# Patient Record
Sex: FEMALE | Race: OTHER | NOT HISPANIC OR LATINO | URBAN - METROPOLITAN AREA
[De-identification: names, ages, dates, MRNs, and addresses within clinical notes are randomized per-mention and may not be internally consistent; named-entity substitution may affect disease eponyms.]

---

## 2023-07-19 ENCOUNTER — INPATIENT (INPATIENT)
Facility: HOSPITAL | Age: 48
LOS: 1 days | Discharge: HOME | DRG: 204 | End: 2023-07-21
Attending: HOSPITALIST | Admitting: HOSPITALIST
Payer: COMMERCIAL

## 2023-07-19 VITALS
DIASTOLIC BLOOD PRESSURE: 70 MMHG | TEMPERATURE: 98 F | HEIGHT: 66 IN | HEART RATE: 85 BPM | WEIGHT: 102.07 LBS | OXYGEN SATURATION: 98 % | RESPIRATION RATE: 20 BRPM | SYSTOLIC BLOOD PRESSURE: 109 MMHG

## 2023-07-19 DIAGNOSIS — Z98.890 OTHER SPECIFIED POSTPROCEDURAL STATES: Chronic | ICD-10-CM

## 2023-07-19 LAB
ALBUMIN SERPL ELPH-MCNC: 4.2 G/DL — SIGNIFICANT CHANGE UP (ref 3.3–5)
ALP SERPL-CCNC: 99 U/L — SIGNIFICANT CHANGE UP (ref 40–120)
ALT FLD-CCNC: 39 U/L — SIGNIFICANT CHANGE UP (ref 10–45)
ANION GAP SERPL CALC-SCNC: 15 MMOL/L — SIGNIFICANT CHANGE UP (ref 5–17)
AST SERPL-CCNC: 47 U/L — HIGH (ref 10–40)
BASE EXCESS BLDV CALC-SCNC: 0.5 MMOL/L — SIGNIFICANT CHANGE UP (ref -2–3)
BASE EXCESS BLDV CALC-SCNC: 2.8 MMOL/L — SIGNIFICANT CHANGE UP (ref -2–3)
BASOPHILS # BLD AUTO: 0 K/UL — SIGNIFICANT CHANGE UP (ref 0–0.2)
BASOPHILS NFR BLD AUTO: 0 % — SIGNIFICANT CHANGE UP (ref 0–2)
BILIRUB SERPL-MCNC: 0.3 MG/DL — SIGNIFICANT CHANGE UP (ref 0.2–1.2)
BUN SERPL-MCNC: 11 MG/DL — SIGNIFICANT CHANGE UP (ref 7–23)
CA-I SERPL-SCNC: 1.16 MMOL/L — SIGNIFICANT CHANGE UP (ref 1.15–1.33)
CA-I SERPL-SCNC: 1.17 MMOL/L — SIGNIFICANT CHANGE UP (ref 1.15–1.33)
CALCIUM SERPL-MCNC: 9.7 MG/DL — SIGNIFICANT CHANGE UP (ref 8.4–10.5)
CHLORIDE BLDV-SCNC: 103 MMOL/L — SIGNIFICANT CHANGE UP (ref 96–108)
CHLORIDE BLDV-SCNC: 107 MMOL/L — SIGNIFICANT CHANGE UP (ref 96–108)
CHLORIDE SERPL-SCNC: 103 MMOL/L — SIGNIFICANT CHANGE UP (ref 96–108)
CO2 BLDV-SCNC: 28 MMOL/L — HIGH (ref 22–26)
CO2 BLDV-SCNC: 33 MMOL/L — HIGH (ref 22–26)
CO2 SERPL-SCNC: 20 MMOL/L — LOW (ref 22–31)
CREAT SERPL-MCNC: 0.61 MG/DL — SIGNIFICANT CHANGE UP (ref 0.5–1.3)
EGFR: 110 ML/MIN/1.73M2 — SIGNIFICANT CHANGE UP
EOSINOPHIL # BLD AUTO: 0.41 K/UL — SIGNIFICANT CHANGE UP (ref 0–0.5)
EOSINOPHIL NFR BLD AUTO: 3.5 % — SIGNIFICANT CHANGE UP (ref 0–6)
GAS PNL BLDV: 134 MMOL/L — LOW (ref 136–145)
GAS PNL BLDV: 136 MMOL/L — SIGNIFICANT CHANGE UP (ref 136–145)
GAS PNL BLDV: SIGNIFICANT CHANGE UP
GLUCOSE BLDV-MCNC: 86 MG/DL — SIGNIFICANT CHANGE UP (ref 70–99)
GLUCOSE BLDV-MCNC: 86 MG/DL — SIGNIFICANT CHANGE UP (ref 70–99)
GLUCOSE SERPL-MCNC: 87 MG/DL — SIGNIFICANT CHANGE UP (ref 70–99)
HCO3 BLDV-SCNC: 27 MMOL/L — SIGNIFICANT CHANGE UP (ref 22–29)
HCO3 BLDV-SCNC: 31 MMOL/L — HIGH (ref 22–29)
HCT VFR BLD CALC: 42.9 % — SIGNIFICANT CHANGE UP (ref 34.5–45)
HCT VFR BLDA CALC: 38 % — SIGNIFICANT CHANGE UP (ref 34.5–46.5)
HCT VFR BLDA CALC: 48 % — HIGH (ref 34.5–46.5)
HGB BLD CALC-MCNC: 12.7 G/DL — SIGNIFICANT CHANGE UP (ref 11.7–16.1)
HGB BLD CALC-MCNC: 16.1 G/DL — SIGNIFICANT CHANGE UP (ref 11.7–16.1)
HGB BLD-MCNC: 13.8 G/DL — SIGNIFICANT CHANGE UP (ref 11.5–15.5)
LACTATE BLDV-MCNC: 1.4 MMOL/L — SIGNIFICANT CHANGE UP (ref 0.5–2)
LACTATE BLDV-MCNC: 2.1 MMOL/L — HIGH (ref 0.5–2)
LYMPHOCYTES # BLD AUTO: 39.1 % — SIGNIFICANT CHANGE UP (ref 13–44)
LYMPHOCYTES # BLD AUTO: 4.54 K/UL — HIGH (ref 1–3.3)
MANUAL SMEAR VERIFICATION: SIGNIFICANT CHANGE UP
MCHC RBC-ENTMCNC: 27.3 PG — SIGNIFICANT CHANGE UP (ref 27–34)
MCHC RBC-ENTMCNC: 32.2 GM/DL — SIGNIFICANT CHANGE UP (ref 32–36)
MCV RBC AUTO: 85 FL — SIGNIFICANT CHANGE UP (ref 80–100)
MONOCYTES # BLD AUTO: 0.71 K/UL — SIGNIFICANT CHANGE UP (ref 0–0.9)
MONOCYTES NFR BLD AUTO: 6.1 % — SIGNIFICANT CHANGE UP (ref 2–14)
NEUTROPHILS # BLD AUTO: 5.96 K/UL — SIGNIFICANT CHANGE UP (ref 1.8–7.4)
NEUTROPHILS NFR BLD AUTO: 49.6 % — SIGNIFICANT CHANGE UP (ref 43–77)
NEUTS BAND # BLD: 1.7 % — SIGNIFICANT CHANGE UP (ref 0–8)
NT-PROBNP SERPL-SCNC: 78 PG/ML — SIGNIFICANT CHANGE UP (ref 0–300)
PCO2 BLDV: 50 MMHG — HIGH (ref 39–42)
PCO2 BLDV: 60 MMHG — HIGH (ref 39–42)
PH BLDV: 7.32 — SIGNIFICANT CHANGE UP (ref 7.32–7.43)
PH BLDV: 7.34 — SIGNIFICANT CHANGE UP (ref 7.32–7.43)
PLAT MORPH BLD: NORMAL — SIGNIFICANT CHANGE UP
PLATELET # BLD AUTO: 253 K/UL — SIGNIFICANT CHANGE UP (ref 150–400)
PO2 BLDV: 20 MMHG — LOW (ref 25–45)
PO2 BLDV: 46 MMHG — HIGH (ref 25–45)
POTASSIUM BLDV-SCNC: 4.1 MMOL/L — SIGNIFICANT CHANGE UP (ref 3.5–5.1)
POTASSIUM BLDV-SCNC: 5.3 MMOL/L — HIGH (ref 3.5–5.1)
POTASSIUM SERPL-MCNC: 4.3 MMOL/L — SIGNIFICANT CHANGE UP (ref 3.5–5.3)
POTASSIUM SERPL-SCNC: 4.3 MMOL/L — SIGNIFICANT CHANGE UP (ref 3.5–5.3)
PROT SERPL-MCNC: 7.9 G/DL — SIGNIFICANT CHANGE UP (ref 6–8.3)
RAPID RVP RESULT: SIGNIFICANT CHANGE UP
RBC # BLD: 5.05 M/UL — SIGNIFICANT CHANGE UP (ref 3.8–5.2)
RBC # FLD: 13.7 % — SIGNIFICANT CHANGE UP (ref 10.3–14.5)
RBC BLD AUTO: SIGNIFICANT CHANGE UP
SAO2 % BLDV: 23.4 % — LOW (ref 67–88)
SAO2 % BLDV: 71 % — SIGNIFICANT CHANGE UP (ref 67–88)
SARS-COV-2 RNA SPEC QL NAA+PROBE: SIGNIFICANT CHANGE UP
SODIUM SERPL-SCNC: 138 MMOL/L — SIGNIFICANT CHANGE UP (ref 135–145)
TROPONIN T, HIGH SENSITIVITY RESULT: <6 NG/L — SIGNIFICANT CHANGE UP (ref 0–51)
WBC # BLD: 11.61 K/UL — HIGH (ref 3.8–10.5)
WBC # FLD AUTO: 11.61 K/UL — HIGH (ref 3.8–10.5)

## 2023-07-19 PROCEDURE — 99285 EMERGENCY DEPT VISIT HI MDM: CPT

## 2023-07-19 PROCEDURE — 71045 X-RAY EXAM CHEST 1 VIEW: CPT | Mod: 26

## 2023-07-19 PROCEDURE — 71275 CT ANGIOGRAPHY CHEST: CPT | Mod: 26,MA

## 2023-07-19 RX ORDER — SODIUM CHLORIDE 9 MG/ML
500 INJECTION INTRAMUSCULAR; INTRAVENOUS; SUBCUTANEOUS ONCE
Refills: 0 | Status: COMPLETED | OUTPATIENT
Start: 2023-07-19 | End: 2023-07-19

## 2023-07-19 RX ADMIN — SODIUM CHLORIDE 500 MILLILITER(S): 9 INJECTION INTRAMUSCULAR; INTRAVENOUS; SUBCUTANEOUS at 19:48

## 2023-07-19 NOTE — ED PROVIDER NOTE - PROGRESS NOTE DETAILS
Pt was not seen as she was getting CXR and I was called to another patients room. Tanner Harding PA-C

## 2023-07-19 NOTE — ED PROVIDER NOTE - ATTENDING CONTRIBUTION TO CARE
RGUJRAL 47yo f hx breast cancer s/p lumpectomy presents with hemoptysis x 1 d. states she woke up this morning and coughed up blood. Denies any recent illness, cough, f/c, travel, AC. Pt complains of mild L chest pain that she has had for "some time". No sob. On exam, Patient is awake,alert,oriented x 3. Patient is well appearing and in no acute distress. Patient's chest is clear to ausculation, +s1s2. Abdomen is soft nd/nt +BS. Extremity with no swelling or calf tenderness.   Picture noted with brb hemoptysis. Pt was qdoc, check labs, CTA, TB QuantiFeron sent and RVP.

## 2023-07-19 NOTE — ED PROVIDER NOTE - PHYSICAL EXAMINATION
Gen: no acute distress  HEENT: atraumatic, normocephalic, extraocular muscles intact, no conjunctival injection  CV: regular rate and rhythym, normal S1/S2, no murmurs, rubs, or gallops  Resp: lungs clear to auscultation bilaterally, no rales, rhonchi, or wheezes  GI: soft, nontender, nondistended  MSK: extremities atraumatic, no cyanosis or clubbing  Skin: warm, dry, no rashes or lesions  Neuro: no focal deficits, sensation grossly intact  Psych: alert and oriented x3

## 2023-07-19 NOTE — ED PROVIDER NOTE - NS ED ROS FT
REVIEW OF SYSTEMS:  CONSTITUTIONAL: No fevers or chills  EYES/ENT: No visual changes;  No vertigo or throat pain   NECK: No pain or stiffness  RESPIRATORY: Cough with hemoptysis since this AM; No shortness of breath  CARDIOVASCULAR: Chest pain described as pressure began this AM. No heart palpitations or leg swelling.   GASTROINTESTINAL: No abdominal or epigastric pain. No nausea, vomiting, or hematemesis; No diarrhea or constipation. No melena or hematochezia.  GENITOURINARY: No dysuria, frequency or hematuria  NEUROLOGICAL: No syncope or dizziness  SKIN: No itching, rashes

## 2023-07-19 NOTE — ED PROVIDER NOTE - CLINICAL SUMMARY MEDICAL DECISION MAKING FREE TEXT BOX
Impression: Juliet Spencer is a 48 year old female PMHx breast cancer s/p lumpectomy on tamoxifen presenting with cough productive of bloody sputum since this AM. Patient does not take blood thinners and has no hx of recent travel. Labs significant for elevated WBC count at 11.61, VBG with some derangements will repeat. CXR showing clear lungs and CTA pendng. Cardiac workup has been negative thus far with negative trops and proBNP WNL.     *** Juliet Spencer is a 48 year old female PMHx breast cancer s/p lumpectomy on tamoxifen presenting with cough productive of bloody sputum since this AM. Patient does not take blood thinners and has no hx of recent travel. Labs significant for elevated WBC count at 11.61, CMP largely unremarkable, VBG with elevated pCO2 at 60 and bicarb at 31, pO2 low at 20. RVP negative, covid negative. CXR clear lungs. CTA chest with no PE, but shows hepatic steatosis. UA and quantiferon TB are pending.    Admitting to medicine for TB rule out and cough with hemoptysis.

## 2023-07-19 NOTE — ED PROVIDER NOTE - OBJECTIVE STATEMENT
48 year old female PMHx breast cancer s/p lumpectomy on tamoxifen presenting to ED with cough with bloody sputum that started this AM. Patient notes first cough productive of about half a cup of bloody sputum and this gradually tapered over the course of the day. The patient also states has chest pain described as a pressure that began at the same time. Patient denies any recent travel and does not take any blood thinners. 48 year old female PMHx breast cancer s/p lumpectomy on tamoxifen presenting to ED with cough with bloody sputum that started this AM. Patient notes first cough productive of about half a cup of bloody sputum and this gradually tapered over the course of the day. The patient also states has chest pain described as a pressure that began at the same time. Pt states had TB test one year ago for work and was negative. Patient denies any recent travel and does not take any blood thinners.

## 2023-07-19 NOTE — ED PROVIDER NOTE - RAPID ASSESSMENT
Private Physician   48y female pmh Breast ca, SP lumpectomy, Chemo no rt, NO dm,htn,hld,cva,cad,habits. Pt comes to ed cough "with a little blood" Pt "first thing I coughed up was blood:" No fever, chills. Has cp left cp worse w palp. No nightsweats. no prior hx of similar illness. 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 No recent travel. PE WDWN Female awake alert normocephalic atraumatic neck supple chest clear anterior & posterior cv no rubs, gallops or murmurs no ttp. Neruo gcs 15 speech fluent  Neal Cohen MD, Facep     PT Seen as Triage Qdoc. Full evaluation to be performed once pt is transferred to Main ED  Neal Cohen MD, Facep Private Physician   48y female pmh Breast ca, SP lumpectomy, Chemo no rt, NO dm,htn,hld,cva,cad,habits. Pt comes to ed cough "with a little blood" Pt "first thing I coughed up was blood:" No fever, chills. Has cp left cp worse w palp. No nightsweats. no prior hx of similar illness. 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 PE WDWN Female awake alert normocephalic atraumatic neck supple chest clear anterior & posterior cv no rubs, gallops or murmurs no ttp. Neruo gcs 15 speech fluent grossly normal.  Neal Cohen MD, Facep     PT Seen as Triage Qdoc. Full evaluation to be performed once pt is transferred to Main ED  Neal Cohen MD, Facep    PT Seen as Triage Qdoc. Full evaluation to be performed once pt is transferred to Main ED  Neal Cohen MD, Facep

## 2023-07-19 NOTE — ED ADULT NURSE NOTE - OBJECTIVE STATEMENT
Pt is a 49 yo female with the co bloody cough today. Pt states that she had 5 episodes today that she noticed blood tinged sputum. She reports intermittent CP for the past several days. She denies any fevers or chills. No SOB no N/V/D. Pt denies any recent travel or sick contacts. Pt has a history of breast ca and had the right breast and lymph nodes removed on the right side. Pt denies any other complaints at this time

## 2023-07-20 ENCOUNTER — TRANSCRIPTION ENCOUNTER (OUTPATIENT)
Age: 48
End: 2023-07-20

## 2023-07-20 DIAGNOSIS — R04.2 HEMOPTYSIS: ICD-10-CM

## 2023-07-20 DIAGNOSIS — Z90.710 ACQUIRED ABSENCE OF BOTH CERVIX AND UTERUS: Chronic | ICD-10-CM

## 2023-07-20 DIAGNOSIS — Z29.9 ENCOUNTER FOR PROPHYLACTIC MEASURES, UNSPECIFIED: ICD-10-CM

## 2023-07-20 DIAGNOSIS — C50.919 MALIGNANT NEOPLASM OF UNSPECIFIED SITE OF UNSPECIFIED FEMALE BREAST: ICD-10-CM

## 2023-07-20 LAB
ANION GAP SERPL CALC-SCNC: 13 MMOL/L — SIGNIFICANT CHANGE UP (ref 5–17)
BUN SERPL-MCNC: 11 MG/DL — SIGNIFICANT CHANGE UP (ref 7–23)
CALCIUM SERPL-MCNC: 9.8 MG/DL — SIGNIFICANT CHANGE UP (ref 8.4–10.5)
CHLORIDE SERPL-SCNC: 101 MMOL/L — SIGNIFICANT CHANGE UP (ref 96–108)
CO2 SERPL-SCNC: 27 MMOL/L — SIGNIFICANT CHANGE UP (ref 22–31)
CREAT SERPL-MCNC: 0.73 MG/DL — SIGNIFICANT CHANGE UP (ref 0.5–1.3)
EGFR: 101 ML/MIN/1.73M2 — SIGNIFICANT CHANGE UP
GLUCOSE SERPL-MCNC: 101 MG/DL — HIGH (ref 70–99)
HCT VFR BLD CALC: 40.3 % — SIGNIFICANT CHANGE UP (ref 34.5–45)
HGB BLD-MCNC: 13.1 G/DL — SIGNIFICANT CHANGE UP (ref 11.5–15.5)
HIV 1+2 AB+HIV1 P24 AG SERPL QL IA: SIGNIFICANT CHANGE UP
MAGNESIUM SERPL-MCNC: 2.3 MG/DL — SIGNIFICANT CHANGE UP (ref 1.6–2.6)
MCHC RBC-ENTMCNC: 27.1 PG — SIGNIFICANT CHANGE UP (ref 27–34)
MCHC RBC-ENTMCNC: 32.5 GM/DL — SIGNIFICANT CHANGE UP (ref 32–36)
MCV RBC AUTO: 83.3 FL — SIGNIFICANT CHANGE UP (ref 80–100)
NRBC # BLD: 0 /100 WBCS — SIGNIFICANT CHANGE UP (ref 0–0)
PHOSPHATE SERPL-MCNC: 3.7 MG/DL — SIGNIFICANT CHANGE UP (ref 2.5–4.5)
PLATELET # BLD AUTO: 254 K/UL — SIGNIFICANT CHANGE UP (ref 150–400)
POTASSIUM SERPL-MCNC: 3.8 MMOL/L — SIGNIFICANT CHANGE UP (ref 3.5–5.3)
POTASSIUM SERPL-SCNC: 3.8 MMOL/L — SIGNIFICANT CHANGE UP (ref 3.5–5.3)
RBC # BLD: 4.84 M/UL — SIGNIFICANT CHANGE UP (ref 3.8–5.2)
RBC # FLD: 13.7 % — SIGNIFICANT CHANGE UP (ref 10.3–14.5)
SODIUM SERPL-SCNC: 141 MMOL/L — SIGNIFICANT CHANGE UP (ref 135–145)
WBC # BLD: 7.53 K/UL — SIGNIFICANT CHANGE UP (ref 3.8–10.5)
WBC # FLD AUTO: 7.53 K/UL — SIGNIFICANT CHANGE UP (ref 3.8–10.5)

## 2023-07-20 PROCEDURE — 99223 1ST HOSP IP/OBS HIGH 75: CPT

## 2023-07-20 RX ORDER — ACETAMINOPHEN 500 MG
650 TABLET ORAL EVERY 6 HOURS
Refills: 0 | Status: DISCONTINUED | OUTPATIENT
Start: 2023-07-20 | End: 2023-07-20

## 2023-07-20 RX ORDER — TAMOXIFEN CITRATE 20 MG/1
1 TABLET, FILM COATED ORAL
Refills: 0 | DISCHARGE

## 2023-07-20 RX ORDER — ONDANSETRON 8 MG/1
4 TABLET, FILM COATED ORAL EVERY 8 HOURS
Refills: 0 | Status: DISCONTINUED | OUTPATIENT
Start: 2023-07-20 | End: 2023-07-20

## 2023-07-20 RX ORDER — CHLORHEXIDINE GLUCONATE 213 G/1000ML
1 SOLUTION TOPICAL DAILY
Refills: 0 | Status: DISCONTINUED | OUTPATIENT
Start: 2023-07-20 | End: 2023-07-21

## 2023-07-20 RX ORDER — LANOLIN ALCOHOL/MO/W.PET/CERES
3 CREAM (GRAM) TOPICAL AT BEDTIME
Refills: 0 | Status: DISCONTINUED | OUTPATIENT
Start: 2023-07-20 | End: 2023-07-20

## 2023-07-20 RX ORDER — TAMOXIFEN CITRATE 20 MG/1
20 TABLET, FILM COATED ORAL DAILY
Refills: 0 | Status: DISCONTINUED | OUTPATIENT
Start: 2023-07-20 | End: 2023-07-21

## 2023-07-20 RX ADMIN — TAMOXIFEN CITRATE 20 MILLIGRAM(S): 20 TABLET, FILM COATED ORAL at 12:12

## 2023-07-20 NOTE — DISCHARGE NOTE PROVIDER - CARE PROVIDER_API CALL
Anson Villela  Family Medicine  [address], [address] [place] [address]  Phone: [phone]  Fax: [phone]  Follow Up Time: 1 week

## 2023-07-20 NOTE — H&P ADULT - NSHPADDITIONALINFOADULT_GEN_ALL_CORE
Fluid: encourage PO intake   Electrolytes: replete potassium, phosphorus and magnesium to 4/3/2 respectively  Nutrition: regular diet   Activity: as tolerated     VTE ppx: mechanical  GI ppx: not indicated     CODE STATUS: FULL

## 2023-07-20 NOTE — PROGRESS NOTE ADULT - SUBJECTIVE AND OBJECTIVE BOX
INTERVAL HPI/OVERNIGHT EVENTS:  Patient was seen and examined at bedside. Patient resting comfortably. No complaints at this time. Patient denies: fever, chills, dizziness, weakness, HA, Changes in vision, CP, palpitations, SOB, cough, N/V/D/C, dysuria, changes in bowel movements, LE edema. ROS otherwise negative.    VITAL SIGNS:  T(F): 97.4 (07-20-23 @ 14:14)  HR: 100 (07-20-23 @ 14:14)  BP: 107/76 (07-20-23 @ 14:14)  RR: 18 (07-20-23 @ 14:14)  SpO2: 99% (07-20-23 @ 14:14)  Wt(kg): --    PHYSICAL EXAM:    General: WN/WD NAD  Neurology: A&Ox3, nonfocal, DIEGO x 4  Eyes: PERRLA/ EOMI, Gross vision intact  ENT/Neck: Neck supple, trachea midline, No JVD, Gross hearing intact  Respiratory: CTA B/L, No wheezing, rales, rhonchi  CV: RRR, +S1/S2, -S3/S4, no murmurs, rubs or gallops  Abdominal: Soft, NT, ND +BS, No HSM  Extremities: No edema, 2+ peripheral pulses  Skin: No Rashes, Hematoma, Ecchymosis      MEDICATIONS  (STANDING):  chlorhexidine 4% Liquid 1 Application(s) Topical daily  tamoxifen 20 milliGRAM(s) Oral daily    MEDICATIONS  (PRN):      Allergies    No Known Allergies    Intolerances        LABS:                        13.1   7.53  )-----------( 254      ( 20 Jul 2023 09:56 )             40.3     07-20    141  |  101  |  11  ----------------------------<  101<H>  3.8   |  27  |  0.73    Ca    9.8      20 Jul 2023 09:56  Phos  3.7     07-20  Mg     2.3     07-20    TPro  7.9  /  Alb  4.2  /  TBili  0.3  /  DBili  x   /  AST  47<H>  /  ALT  39  /  AlkPhos  99  07-19      Urinalysis Basic - ( 20 Jul 2023 09:56 )    Color: x / Appearance: x / SG: x / pH: x  Gluc: 101 mg/dL / Ketone: x  / Bili: x / Urobili: x   Blood: x / Protein: x / Nitrite: x   Leuk Esterase: x / RBC: x / WBC x   Sq Epi: x / Non Sq Epi: x / Bacteria: x        RADIOLOGY & ADDITIONAL TESTS:  Reviewed

## 2023-07-20 NOTE — DISCHARGE NOTE PROVIDER - HOSPITAL COURSE
· Assessment	  48F PMHx breast cancer s/p surgery, chemo but no RT on maintenance tamoxifen. Presenting with new onset hemoptysis x5 episodes on 7/19. Since then, she did not have any more episode.      Problem/Plan - 1:  ·  Problem: Hemoptysis.   ·  Plan: -CTPE without evidence of PE. CT chest also negative for metastatic cancer.   -rapid RVP and covid neg as well.   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 QuantiFeron test is sent. less likely TB.       Problem/Plan - 2:  ·  Problem: Breast cancer.   ·  Plan: in remission. c/w home tamoxifen.    pt remains stable for DC to home. The patient is a 49yo woman with PMHx breast cancer s/p surgery, chemo but no RT on maintenance tamoxifen. She presented to the hospital with new onset hemoptysis on 5/19. She experienced episodes on 5/19 which gradually resolved over the course of the day and has not recurred since then. The patient was asymptomatic in the ED and denied fever, chills, night sweats, weight loss, and blood thinner use. The patient is from [facility], and has not had any recent travel of sick contacts. 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    The patient was afebrile and normotensive in the ED and her ECG was sinus with HR of 87. Chest x-ray was clear and CTPE was negative for signs of pulmonary embolism. The patient's RVR and Covid PCR were negative. CBC in the ED demonstrated a small leukocytosis which resolved the next morning. The remainder of the CBC, blood chemistries, and VBGs were within normal limits. The patient was given 500mL of normal saline and a Quantiferon Gold was sent for evaluation. The patient was admitted to the medicine floors for airborne precautions until the results of the Quantiferon Gold were obtained.    The patient was asymptomatic on the medicine floors and her morning CBC and CMP were normal on 7/21. The patient tested negative on her Quantiferon Gold test on 7/21 and she was removed from airborne precautions and cleared to return home.    Patient was medically optimized and hemodynamically stable for discharge to home with plans for follow up to her PCP within 1 week.

## 2023-07-20 NOTE — DISCHARGE NOTE PROVIDER - NSRESEARCHGRANT_PROPHYLAXISRECOMFT_GEN_A_CORE
Attended Phase II Cardiac Rehab. No medication or health history changes reported. See [facility] for details.  
IMPROVE-DD Application Not Available

## 2023-07-20 NOTE — H&P ADULT - NSHPLABSRESULTS_GEN_ALL_CORE
I personally reviewed the CXR: no acute cardiopulmonary process  I personally reviewed the EKG: NSR, no acute ischemic changes, QTC wnl

## 2023-07-20 NOTE — PATIENT PROFILE ADULT - FALL HARM RISK - UNIVERSAL INTERVENTIONS
Bed in lowest position, wheels locked, appropriate side rails in place/Call bell, personal items and telephone in reach/Instruct patient to call for assistance before getting out of bed or chair/Non-slip footwear when patient is out of bed/[place] to call system/Physically safe environment - no spills, clutter or unnecessary equipment/Purposeful Proactive Rounding/Room/bathroom lighting operational, light cord in reach

## 2023-07-20 NOTE — H&P ADULT - ASSESSMENT
48F PMHx breast cancer s/p surgery, chemo but no RT on maintenance tamoxifen. Presenting with new onset hemoptysis x5 episodes on 7/19. Since then, she did not have any more episode.

## 2023-07-20 NOTE — H&P ADULT - NSICDXPASTSURGICALHX_GEN_ALL_CORE_FT
Group Topic: BH Coping Skills Education    Date: 5/19/2023  Start Time: 10:00 AM  End Time: 10:50 AM  Facilitators: Roselyn Mark APSW    Focus: Values (Problem Solving Skills)  Number in attendance: 7    .The video,\"This is why your depressed or anxious\" was presented in group. Patient discussed their view of the video and how they can relate.  ?  SAMMI Howard      Method: Group  Attendance: Called out to meet with psychiatrist  Participation: Called out to meet with psychiatrist  Patient Response: Called out to meet with psyhciatrist    Pt was called out to meet psychiatrist for the duration of group.          PAST SURGICAL HISTORY:  H/O lumpectomy     S/P complete hysterectomy

## 2023-07-20 NOTE — DISCHARGE NOTE PROVIDER - NSDCFUADDAPPT_GEN_ALL_CORE_FT
APPTS ARE READY TO BE MADE: [x ] YES    Best Family or Patient Contact (if needed):    Additional Information about above appointments (if needed):    1:   2:   3:     Other comments or requests:    APPTS ARE READY TO BE MADE: [x ] YES    Best Family or Patient Contact (if needed):    Additional Information about above appointments (if needed):    1:   2:   3:     Other comments or requests:     Patient was scheduled with Dr. Kate Renteria on 07/27/2023 2:00PM at [address] [place] [address] through the provider's office. Patient advised of appointment details.    Phone: [phone] APPTS ARE READY TO BE MADE: [x ] YES    Best Family or Patient Contact (if needed):    Additional Information about above appointments (if needed):    1:   2:   3:     Other comments or requests:     Patient was scheduled with Dr. Kate Renteria on 07/27/2023 2:00PM at [address] [place] [address] through the provider's office. Patient advised of appointment details.    Dr. Renteria's office number: [phone] APPTS ARE READY TO BE MADE: [x ] YES    Best Family or Patient Contact (if needed):    Additional Information about above appointments (if needed):    1:   2:   3:     Other comments or requests:     Patient was scheduled with Dr. Kate Renteria on 07/27/2023 2:00PM at [address] [place] [address] through the provider's office. Patient advised of appointment details.    Dr. Renteria's office number: [phone]    Patient was advised of follow up requests and asked for scheduling assistance. Will await a call back from the Oncologists office/ C.C.D to confirm appointment details.

## 2023-07-20 NOTE — H&P ADULT - NSHPPHYSICALEXAM_GEN_ALL_CORE
Vital Signs Last 24 Hrs  T(C): 36.7 (19 Jul 2023 23:30), Max: 36.9 (19 Jul 2023 16:46)  T(F): 98.1 (19 Jul 2023 23:30), Max: 98.4 (19 Jul 2023 16:46)  HR: 82 (19 Jul 2023 23:30) (75 - 85)  BP: 113/73 (19 Jul 2023 23:30) (109/70 - 119/79)  BP(mean): 86 (19 Jul 2023 23:30) (86 - 86)  RR: 20 (19 Jul 2023 23:30) (16 - 20)  SpO2: 100% (19 Jul 2023 23:30) (95% - 100%)    Parameters below as of 19 Jul 2023 23:30  Patient On (Oxygen Delivery Method): room air        CONSTITUTIONAL: Well-groomed, in no apparent distress  EYES: No conjunctival or scleral injection, non-icteric;   ENMT: No external nasal lesions; MMM  RESPIRATORY: Breathing comfortably; no dullness to percussion; lungs CTA without wheeze/rhonchi/rales  CARDIOVASCULAR: +S1S2, RRR, no M/G/R; pedal pulses full and symmetric; no lower extremity edema  GASTROINTESTINAL: No tenderness, +BS throughout, no rebound/guarding;   SKIN: No rashes or ulcers noted  NEUROLOGIC: No gross focal neurological deficits; CN II-XII intact; sensation intact in LEs b/l to light touch  PSYCHIATRIC: A+O x 3; mood and affect appropriate; appropriate insight and judgment

## 2023-07-20 NOTE — H&P ADULT - TIME BILLING
Time-based billing (NON-critical care).   The necessity of the time spent during the encounter on this date of service was due to:     - Ordering, reviewing, and interpreting labs, testing, and imaging.  - Independently obtaining a review of systems and performing a physical exam  - Reviewing prior hospitalization and where necessary, outpatient records.  - Counselling and educating patient and family regarding interpretation of aforementioned items and plan of care.

## 2023-07-20 NOTE — H&P ADULT - PROBLEM SELECTOR PLAN 1
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 QuantiFeron test is pending (ordered in the ED)  -patient has a picture in her phone, which showed three lumps of  bright red bloody mucous.   -patient did not have any more hemoptysis episode since coming to ED.  -hemoglobin is wnl.   -patient instructed to collect her sputum in the specimen cup for further testing  -continue airborne isolation, pending QuantiFeron test  -if patient continues to have hemoptysis, consider CT face/neck or ENT evaluation. -CTPE without evidence of PE. CT chest also negative for metastatic cancer.   -rapid RVP and covid neg as well.   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 QuantiFeron test is pending (ordered in the ED).   -patient has a picture in her phone, which showed three lumps of  bright red bloody mucous.   -patient did not have any more hemoptysis episode since coming to ED.  -hemoglobin is wnl.   -patient instructed to collect her sputum in the specimen cup for further testing  -continue airborne isolation, pending QuantiFeron test  -if patient continues to have hemoptysis, consider CT face/neck or ENT evaluation.

## 2023-07-20 NOTE — CHART NOTE - NSCHARTNOTEFT_GEN_A_CORE
Patient seen and examined. Plan as discussed w/ ACP Cristi Calle: while hemoptysis has resolved, patient still with cough, ?related to travel from [facility] -- while low suspicion for acute TB given imaging findings and lack of known exposure, will follow-up quantiferon gold as sent by overnight admitting team; H&H and hemodynamically stable, will D/C patient home after results with close f/u w/ PMD within 1 week of discharge.    Claudio Kumar M.D.  Division of Hospital Medicine  Available via TEAMS

## 2023-07-20 NOTE — H&P ADULT - HISTORY OF PRESENT ILLNESS
48F PMHx significant for breast CA s/p lumpectomy, complete hysterectomy with bilateral salpingo-oophorectomy, s/p chemo but no radiation therapy.  Pt presents with 5 episodes of hemoptysis on 7/19. She reports she's never had sx like this before. She was NOT coughing prior to having hemoptysis.   She was born in [facility], came [place]. 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 She denies any known sick contact (her patient not actively ill). She had previous TB skin test which was negative. She denies recent travel.  Other than hemoptysis she denies any other sx. Including HA, dizziness, CP, SOB, fever, chills, palpitation, n/v/d, recent weight loss, generalized malaise or myalgia, dysuria, hematuria.   As for the chest pain she reported to ED physician, patient states that she occasionally has sharp mid-chest pain, which is then also tender to palpation and is not associated with coughing.

## 2023-07-20 NOTE — DISCHARGE NOTE PROVIDER - NSDCCPCAREPLAN_GEN_ALL_CORE_FT
PRINCIPAL DISCHARGE DIAGNOSIS  Diagnosis: Cough with hemoptysis  Assessment and Plan of Treatment: Chest xray with clear lungs.   CTA chest show negative pulmonary emboli.   No WBC. No signs and symptoms of TB.   Hg/HCT stable.         SECONDARY DISCHARGE DIAGNOSES  Diagnosis: Breast cancer  Assessment and Plan of Treatment: hx of cancer.   no remission .  continue with home chemotherapy.     PRINCIPAL DISCHARGE DIAGNOSIS  Diagnosis: Cough with hemoptysis  Assessment and Plan of Treatment: Hemoptysis is when you cough up blood. If it is mild, you may cough up small amounts of bloody spit and mucus from your lungs. If it is very bad, you may cough up a lot of blood. If you cough up 1–2 cups (240–480 mL) of blood within 24 hours, get help right away.  Common causes of mild (nonmassive) hemoptysis include:  Infection in your nose, throat, or lungs.  Nosebleeds.  Breathing in an object that is not meant to be inside your body.  Common causes of very bad (massive) hemoptysis include:  Tuberculosis (TB).  A tumor in the lungs or upper airway.  A blood clot in the lungs.  Stomach bleeding or ulcer.  Taking blood thinners.  Having problems with blood clotting.  While in the hospital you were assessed for the causes of mild and very bad hemoptysis, and we did not find any signs of active bleed, no signs of pneumonia or a pulmonary embolism. We sent for a test called Quantiferon Gold which is the gold standard to test for tuberculosis which was also negative. There are no acute concerns for your episode of hemoptysis.  If bleeding resumes, plese come back to the hospital for further assessment. Otherwise please make an appointment with your primary care doctor for follow-up.      SECONDARY DISCHARGE DIAGNOSES  Diagnosis: Breast cancer  Assessment and Plan of Treatment: History of breast cancer  - Continue with home chemotherapy

## 2023-07-21 ENCOUNTER — TRANSCRIPTION ENCOUNTER (OUTPATIENT)
Age: 48
End: 2023-07-21

## 2023-07-21 VITALS
TEMPERATURE: 99 F | RESPIRATION RATE: 18 BRPM | DIASTOLIC BLOOD PRESSURE: 63 MMHG | OXYGEN SATURATION: 97 % | HEART RATE: 96 BPM | SYSTOLIC BLOOD PRESSURE: 99 MMHG

## 2023-07-21 LAB
ALBUMIN SERPL ELPH-MCNC: 3.8 G/DL — SIGNIFICANT CHANGE UP (ref 3.3–5)
ALP SERPL-CCNC: 83 U/L — SIGNIFICANT CHANGE UP (ref 40–120)
ALT FLD-CCNC: 32 U/L — SIGNIFICANT CHANGE UP (ref 10–45)
ANION GAP SERPL CALC-SCNC: 12 MMOL/L — SIGNIFICANT CHANGE UP (ref 5–17)
AST SERPL-CCNC: 31 U/L — SIGNIFICANT CHANGE UP (ref 10–40)
BASOPHILS # BLD AUTO: 0.03 K/UL — SIGNIFICANT CHANGE UP (ref 0–0.2)
BASOPHILS NFR BLD AUTO: 0.4 % — SIGNIFICANT CHANGE UP (ref 0–2)
BILIRUB SERPL-MCNC: 0.3 MG/DL — SIGNIFICANT CHANGE UP (ref 0.2–1.2)
BUN SERPL-MCNC: 15 MG/DL — SIGNIFICANT CHANGE UP (ref 7–23)
CALCIUM SERPL-MCNC: 9.3 MG/DL — SIGNIFICANT CHANGE UP (ref 8.4–10.5)
CHLORIDE SERPL-SCNC: 103 MMOL/L — SIGNIFICANT CHANGE UP (ref 96–108)
CO2 SERPL-SCNC: 23 MMOL/L — SIGNIFICANT CHANGE UP (ref 22–31)
CREAT SERPL-MCNC: 0.81 MG/DL — SIGNIFICANT CHANGE UP (ref 0.5–1.3)
EGFR: 89 ML/MIN/1.73M2 — SIGNIFICANT CHANGE UP
EOSINOPHIL # BLD AUTO: 0.27 K/UL — SIGNIFICANT CHANGE UP (ref 0–0.5)
EOSINOPHIL NFR BLD AUTO: 3.2 % — SIGNIFICANT CHANGE UP (ref 0–6)
GAMMA INTERFERON BACKGROUND BLD IA-ACNC: 0.03 IU/ML — SIGNIFICANT CHANGE UP
GLUCOSE SERPL-MCNC: 102 MG/DL — HIGH (ref 70–99)
HCT VFR BLD CALC: 38.8 % — SIGNIFICANT CHANGE UP (ref 34.5–45)
HGB BLD-MCNC: 12.6 G/DL — SIGNIFICANT CHANGE UP (ref 11.5–15.5)
IMM GRANULOCYTES NFR BLD AUTO: 0.4 % — SIGNIFICANT CHANGE UP (ref 0–0.9)
LYMPHOCYTES # BLD AUTO: 3.78 K/UL — HIGH (ref 1–3.3)
LYMPHOCYTES # BLD AUTO: 44.1 % — HIGH (ref 13–44)
M TB IFN-G BLD-IMP: NEGATIVE — SIGNIFICANT CHANGE UP
M TB IFN-G CD4+ BCKGRND COR BLD-ACNC: 0.01 IU/ML — SIGNIFICANT CHANGE UP
M TB IFN-G CD4+CD8+ BCKGRND COR BLD-ACNC: 0 IU/ML — SIGNIFICANT CHANGE UP
MAGNESIUM SERPL-MCNC: 2.2 MG/DL — SIGNIFICANT CHANGE UP (ref 1.6–2.6)
MCHC RBC-ENTMCNC: 27.4 PG — SIGNIFICANT CHANGE UP (ref 27–34)
MCHC RBC-ENTMCNC: 32.5 GM/DL — SIGNIFICANT CHANGE UP (ref 32–36)
MCV RBC AUTO: 84.3 FL — SIGNIFICANT CHANGE UP (ref 80–100)
MONOCYTES # BLD AUTO: 0.68 K/UL — SIGNIFICANT CHANGE UP (ref 0–0.9)
MONOCYTES NFR BLD AUTO: 7.9 % — SIGNIFICANT CHANGE UP (ref 2–14)
MRSA PCR RESULT.: SIGNIFICANT CHANGE UP
NEUTROPHILS # BLD AUTO: 3.78 K/UL — SIGNIFICANT CHANGE UP (ref 1.8–7.4)
NEUTROPHILS NFR BLD AUTO: 44 % — SIGNIFICANT CHANGE UP (ref 43–77)
NRBC # BLD: 0 /100 WBCS — SIGNIFICANT CHANGE UP (ref 0–0)
PHOSPHATE SERPL-MCNC: 3.9 MG/DL — SIGNIFICANT CHANGE UP (ref 2.5–4.5)
PLATELET # BLD AUTO: 271 K/UL — SIGNIFICANT CHANGE UP (ref 150–400)
POTASSIUM SERPL-MCNC: 3.7 MMOL/L — SIGNIFICANT CHANGE UP (ref 3.5–5.3)
POTASSIUM SERPL-SCNC: 3.7 MMOL/L — SIGNIFICANT CHANGE UP (ref 3.5–5.3)
PROT SERPL-MCNC: 6.9 G/DL — SIGNIFICANT CHANGE UP (ref 6–8.3)
QUANT TB PLUS MITOGEN MINUS NIL: 8.82 IU/ML — SIGNIFICANT CHANGE UP
RBC # BLD: 4.6 M/UL — SIGNIFICANT CHANGE UP (ref 3.8–5.2)
RBC # FLD: 13.7 % — SIGNIFICANT CHANGE UP (ref 10.3–14.5)
S AUREUS DNA NOSE QL NAA+PROBE: SIGNIFICANT CHANGE UP
SODIUM SERPL-SCNC: 138 MMOL/L — SIGNIFICANT CHANGE UP (ref 135–145)
WBC # BLD: 8.57 K/UL — SIGNIFICANT CHANGE UP (ref 3.8–10.5)
WBC # FLD AUTO: 8.57 K/UL — SIGNIFICANT CHANGE UP (ref 3.8–10.5)

## 2023-07-21 PROCEDURE — 87640 STAPH A DNA AMP PROBE: CPT

## 2023-07-21 PROCEDURE — 87641 MR-STAPH DNA AMP PROBE: CPT

## 2023-07-21 PROCEDURE — 84484 ASSAY OF TROPONIN QUANT: CPT

## 2023-07-21 PROCEDURE — 83880 ASSAY OF NATRIURETIC PEPTIDE: CPT

## 2023-07-21 PROCEDURE — 83735 ASSAY OF MAGNESIUM: CPT

## 2023-07-21 PROCEDURE — 84132 ASSAY OF SERUM POTASSIUM: CPT

## 2023-07-21 PROCEDURE — 82435 ASSAY OF BLOOD CHLORIDE: CPT

## 2023-07-21 PROCEDURE — 83605 ASSAY OF LACTIC ACID: CPT

## 2023-07-21 PROCEDURE — 84295 ASSAY OF SERUM SODIUM: CPT

## 2023-07-21 PROCEDURE — 82803 BLOOD GASES ANY COMBINATION: CPT

## 2023-07-21 PROCEDURE — 0225U NFCT DS DNA&RNA 21 SARSCOV2: CPT

## 2023-07-21 PROCEDURE — 71275 CT ANGIOGRAPHY CHEST: CPT | Mod: MA

## 2023-07-21 PROCEDURE — 71045 X-RAY EXAM CHEST 1 VIEW: CPT

## 2023-07-21 PROCEDURE — 86480 TB TEST CELL IMMUN MEASURE: CPT

## 2023-07-21 PROCEDURE — 87389 HIV-1 AG W/HIV-1&-2 AB AG IA: CPT

## 2023-07-21 PROCEDURE — 80048 BASIC METABOLIC PNL TOTAL CA: CPT

## 2023-07-21 PROCEDURE — 85014 HEMATOCRIT: CPT

## 2023-07-21 PROCEDURE — 84100 ASSAY OF PHOSPHORUS: CPT

## 2023-07-21 PROCEDURE — 80053 COMPREHEN METABOLIC PANEL: CPT

## 2023-07-21 PROCEDURE — 85025 COMPLETE CBC W/AUTO DIFF WBC: CPT

## 2023-07-21 PROCEDURE — 82947 ASSAY GLUCOSE BLOOD QUANT: CPT

## 2023-07-21 PROCEDURE — 85027 COMPLETE CBC AUTOMATED: CPT

## 2023-07-21 PROCEDURE — 82330 ASSAY OF CALCIUM: CPT

## 2023-07-21 PROCEDURE — 93005 ELECTROCARDIOGRAM TRACING: CPT

## 2023-07-21 PROCEDURE — 85018 HEMOGLOBIN: CPT

## 2023-07-21 PROCEDURE — 99239 HOSP IP/OBS DSCHRG MGMT >30: CPT | Mod: GC

## 2023-07-21 PROCEDURE — 99285 EMERGENCY DEPT VISIT HI MDM: CPT

## 2023-07-21 RX ADMIN — TAMOXIFEN CITRATE 20 MILLIGRAM(S): 20 TABLET, FILM COATED ORAL at 12:25

## 2023-07-21 NOTE — PROGRESS NOTE ADULT - SUBJECTIVE AND OBJECTIVE BOX
INTERVAL HPI/OVERNIGHT EVENTS:  Patient was seen and examined at bedside. As per nurse and patient, no o/n events, patient resting comfortably. No complaints at this time. Patient denies: fever, chills, dizziness, weakness, HA, Changes in vision, CP, palpitations, SOB, cough, N/V/D/C, dysuria, changes in bowel movements, LE edema. ROS otherwise negative.    VITAL SIGNS:  T(F): 98.2 (07-21-23 @ 04:59)  HR: 89 (07-21-23 @ 04:59)  BP: 104/71 (07-21-23 @ 04:59)  RR: 18 (07-21-23 @ 04:59)  SpO2: 97% (07-21-23 @ 04:59)  Wt(kg): --    PHYSICAL EXAM:    General: WN/WD NAD  Neurology: A&Ox3, nonfocal, DIEGO x 4  Eyes: PERRLA/ EOMI, Gross vision intact  ENT/Neck: Neck supple, trachea midline, No JVD, Gross hearing intact  Respiratory: CTA B/L, No wheezing, rales, rhonchi  CV: RRR, +S1/S2, -S3/S4, no murmurs, rubs or gallops  Abdominal: Soft, NT, ND +BS, No HSM  MSK: 5/5 strength UE/LE bilaterally  Extremities: No edema, 2+ peripheral pulses  Skin: No Rashes, Hematoma, Ecchymosis  Incisions:   Tubes:    MEDICATIONS  (STANDING):  chlorhexidine 4% Liquid 1 Application(s) Topical daily  tamoxifen 20 milliGRAM(s) Oral daily    MEDICATIONS  (PRN):      Allergies    No Known Allergies    Intolerances        LABS:                        13.1   7.53  )-----------( 254      ( 20 Jul 2023 09:56 )             40.3     07-20    141  |  101  |  11  ----------------------------<  101<H>  3.8   |  27  |  0.73    Ca    9.8      20 Jul 2023 09:56  Phos  3.7     07-20  Mg     2.3     07-20    TPro  7.9  /  Alb  4.2  /  TBili  0.3  /  DBili  x   /  AST  47<H>  /  ALT  39  /  AlkPhos  99  07-19      Urinalysis Basic - ( 20 Jul 2023 09:56 )    Color: x / Appearance: x / SG: x / pH: x  Gluc: 101 mg/dL / Ketone: x  / Bili: x / Urobili: x   Blood: x / Protein: x / Nitrite: x   Leuk Esterase: x / RBC: x / WBC x   Sq Epi: x / Non Sq Epi: x / Bacteria: x        RADIOLOGY & ADDITIONAL TESTS:  Reviewed INTERVAL HPI/OVERNIGHT EVENTS:  Patient was seen and examined at bedside. As per nurse and patient, no o/n events, patient resting comfortably. No complaints at this time. Patient denies: fever, chills, dizziness, weakness, HA, Changes in vision, CP, palpitations, SOB, cough, N/V/D/C, dysuria, changes in bowel movements, LE edema. ROS otherwise negative.    VITAL SIGNS:  T(F): 98.2 (07-21-23 @ 04:59)  HR: 89 (07-21-23 @ 04:59)  BP: 104/71 (07-21-23 @ 04:59)  RR: 18 (07-21-23 @ 04:59)  SpO2: 97% (07-21-23 @ 04:59)  Wt(kg): --    PHYSICAL EXAM:    General: WN/WD NAD  Neurology: A&Ox3, nonfocal, DIEGO x 4  Eyes: EOMI, Gross vision intact  ENT/Neck: Neck supple, trachea midline, No JVD, Gross hearing intact  Respiratory: CTA B/L, No wheezing, rales, rhonchi  CV: RRR, +S1/S2, -S3/S4, no murmurs, rubs or gallops  Abdominal: Soft, NT, ND +BS, No HSM  Extremities: No edema, 2+ peripheral pulses  Skin: No Rashes, Hematoma, Ecchymosis      MEDICATIONS  (STANDING):  chlorhexidine 4% Liquid 1 Application(s) Topical daily  tamoxifen 20 milliGRAM(s) Oral daily    MEDICATIONS  (PRN):      Allergies    No Known Allergies    Intolerances        LABS:                        13.1   7.53  )-----------( 254      ( 20 Jul 2023 09:56 )             40.3     07-20    141  |  101  |  11  ----------------------------<  101<H>  3.8   |  27  |  0.73    Ca    9.8      20 Jul 2023 09:56  Phos  3.7     07-20  Mg     2.3     07-20    TPro  7.9  /  Alb  4.2  /  TBili  0.3  /  DBili  x   /  AST  47<H>  /  ALT  39  /  AlkPhos  99  07-19      Urinalysis Basic - ( 20 Jul 2023 09:56 )    Color: x / Appearance: x / SG: x / pH: x  Gluc: 101 mg/dL / Ketone: x  / Bili: x / Urobili: x   Blood: x / Protein: x / Nitrite: x   Leuk Esterase: x / RBC: x / WBC x   Sq Epi: x / Non Sq Epi: x / Bacteria: x        RADIOLOGY & ADDITIONAL TESTS:  Reviewed

## 2023-07-21 NOTE — PROGRESS NOTE ADULT - ATTENDING COMMENTS
Hospitalist- Justin Green MD  Available on MS Teams  If no response or off-hours, page [phone]  -------------------------------------    Pt without any further hemoptysis, quant gold negative, cleared for dc home with op f/u with pcp.        Total discharge time: 45 minutes.

## 2023-07-21 NOTE — DISCHARGE NOTE NURSING/CASE MANAGEMENT/SOCIAL WORK - NSDCFUADDAPPT_GEN_ALL_CORE_FT
APPTS ARE READY TO BE MADE: [x ] YES    Best Family or Patient Contact (if needed):    Additional Information about above appointments (if needed):    1:   2:   3:     Other comments or requests:     Patient was scheduled with Dr. Kate Renteria on 07/27/2023 2:00PM at [address] [place] [address] through the provider's office. Patient advised of appointment details.    Dr. Renteria's office number: [phone]    Patient was advised of follow up requests and asked for scheduling assistance. Will await a call back from the Oncologists office/ [facility] to confirm appointment details.

## 2023-07-21 NOTE — PROGRESS NOTE ADULT - PROBLEM SELECTOR PLAN 3
Activity: Advance as Tolerated  DVT ppx: SCDs  Diet: Regular  Dispo: Floors  Electrolytes: Replete as necessary  Fluids: 500mL NS in ED
Activity: Advance as Tolerated  DVT ppx: SCDs  Diet: Regular  Dispo: Floors  Electrolytes: Replete as necessary  Fluids: 500mL NS in ED

## 2023-07-21 NOTE — PROGRESS NOTE ADULT - ASSESSMENT
48F PMHx breast cancer s/p surgery, chemo but no RT on maintenance tamoxifen. Presenting with new onset hemoptysis x5 episodes on 7/19. Since then, she did not have any more episode. 
48F PMHx breast cancer s/p surgery, chemo but no RT on maintenance tamoxifen. Presenting with new onset hemoptysis x5 episodes on 7/19. Since then, she did not have any more episode.

## 2023-07-21 NOTE — DISCHARGE NOTE NURSING/CASE MANAGEMENT/SOCIAL WORK - NSDCPEFALRISK_GEN_ALL_CORE
For information on Fall & Injury Prevention, visit: [email or web address] OR  [email or web address] OR  https://www.cdc.gov/steadi/patient.html

## 2023-07-21 NOTE — PROGRESS NOTE ADULT - PROBLEM SELECTOR PLAN 1
- CTPE without evidence of PE. CT chest also negative for metastatic cancer.   - Rapid RVP and covid neg as well.   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 QuantiFeron test is pending (ordered in the ED).   - Patient has a picture in her phone, which showed three lumps of  bright red bloody mucous.   - Patient did not have any more hemoptysis episode since coming to ED.  - Hemoglobin is wnl.   - Patient instructed to collect her sputum in the specimen cup for further testing  - Continue airborne isolation, pending QuantiFeron test  - If patient continues to have hemoptysis, consider CT face/neck or ENT evaluation.
- CTPE without evidence of PE. CT chest also negative for metastatic cancer.   - Rapid RVP and covid neg as well.   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 QuantiFeron test is pending (ordered in the ED).   - Patient has a picture in her phone, which showed three lumps of  bright red bloody mucous.   - Patient did not have any more hemoptysis episode since coming to ED.  - Hemoglobin is wnl.   - Patient instructed to collect her sputum in the specimen cup for further testing  - Continue airborne isolation, pending QuantiFeron test  - If patient continues to have hemoptysis, consider CT face/neck or ENT evaluation.    - Negative quantiferon gold on 7/21 with no recurrence of symptoms, cleared for d/c

## 2023-07-21 NOTE — DISCHARGE NOTE NURSING/CASE MANAGEMENT/SOCIAL WORK - PATIENT PORTAL LINK FT
You can access the FollowMyHealth Patient Portal offered by [facility] by registering at the following website: [email or web address]. By joining [employer]’s FollowMyHealth portal, you will also be able to view your health information using other applications (apps) compatible with our system.

## 2023-07-28 NOTE — H&P ADULT - NSVTERISKREFERASSESS_GEN_ALL_CORE
For LEG CRAMPS  - try stretching legs before bed  - if not helping, try taking 400-500mg magnesium complex daily to help with leg cramps  
Refer to the Assessment tab to view/cancel completed assessment.

## 2023-08-08 PROBLEM — Z00.00 ENCOUNTER FOR PREVENTIVE HEALTH EXAMINATION: Status: ACTIVE | Noted: 2023-08-08
